# Patient Record
Sex: MALE | Race: WHITE | NOT HISPANIC OR LATINO | Employment: OTHER | URBAN - METROPOLITAN AREA
[De-identification: names, ages, dates, MRNs, and addresses within clinical notes are randomized per-mention and may not be internally consistent; named-entity substitution may affect disease eponyms.]

---

## 2018-05-24 ENCOUNTER — OFF PREMISE (OUTPATIENT)
Dept: OTHER | Age: 83
End: 2018-05-24

## 2018-11-05 ENCOUNTER — HOSPITAL ENCOUNTER (INPATIENT)
Facility: HOSPITAL | Age: 83
LOS: 2 days | Discharge: HOME | DRG: 392 | End: 2018-11-08
Attending: EMERGENCY MEDICINE | Admitting: HOSPITALIST
Payer: MEDICARE

## 2018-11-05 ENCOUNTER — APPOINTMENT (OUTPATIENT)
Dept: CT IMAGING | Facility: HOSPITAL | Age: 83
DRG: 392 | End: 2018-11-05
Attending: EMERGENCY MEDICINE
Payer: MEDICARE

## 2018-11-05 DIAGNOSIS — R10.9 ABDOMINAL PAIN, ACUTE: ICD-10-CM

## 2018-11-05 DIAGNOSIS — K44.9 HIATAL HERNIA: Primary | ICD-10-CM

## 2018-11-05 DIAGNOSIS — R11.11 VOMITING WITHOUT NAUSEA, INTRACTABILITY OF VOMITING NOT SPECIFIED, UNSPECIFIED VOMITING TYPE: ICD-10-CM

## 2018-11-05 PROCEDURE — 74177 CT ABD & PELVIS W/CONTRAST: CPT | Performed by: EMERGENCY MEDICINE

## 2018-11-05 RX ORDER — AMLODIPINE BESYLATE 10 MG/1
10 TABLET ORAL DAILY
COMMUNITY

## 2018-11-05 RX ORDER — MAGNESIUM HYDROXIDE/ALUMINUM HYDROXICE/SIMETHICONE 120; 1200; 1200 MG/30ML; MG/30ML; MG/30ML
30 SUSPENSION ORAL ONCE
Status: COMPLETED | OUTPATIENT
Start: 2018-11-05 | End: 2018-11-05

## 2018-11-05 RX ORDER — ALBUTEROL SULFATE 2.5 MG/3ML
SOLUTION RESPIRATORY (INHALATION) EVERY 6 HOURS PRN
COMMUNITY

## 2018-11-05 RX ORDER — KETOROLAC TROMETHAMINE 30 MG/ML
15 INJECTION, SOLUTION INTRAMUSCULAR; INTRAVENOUS ONCE
Status: COMPLETED | OUTPATIENT
Start: 2018-11-05 | End: 2018-11-05

## 2018-11-06 PROBLEM — R11.11 VOMITING WITHOUT NAUSEA, INTRACTABILITY OF VOMITING NOT SPECIFIED, UNSPECIFIED VOMITING TYPE: Status: ACTIVE | Noted: 2018-11-06

## 2018-11-06 PROBLEM — R10.9 ABDOMINAL PAIN, ACUTE: Status: ACTIVE | Noted: 2018-11-06

## 2018-11-06 PROBLEM — K44.9 HIATAL HERNIA: Status: ACTIVE | Noted: 2018-11-06

## 2018-11-06 PROCEDURE — 99223 1ST HOSP IP/OBS HIGH 75: CPT | Performed by: HOSPITALIST

## 2018-11-06 PROCEDURE — 99223 1ST HOSP IP/OBS HIGH 75: CPT | Performed by: SURGERY

## 2018-11-06 RX ORDER — POTASSIUM CHLORIDE 14.9 MG/ML
20 INJECTION INTRAVENOUS ONCE
Status: COMPLETED | OUTPATIENT
Start: 2018-11-06 | End: 2018-11-06

## 2018-11-06 RX ORDER — KETOROLAC TROMETHAMINE 30 MG/ML
15 INJECTION, SOLUTION INTRAMUSCULAR; INTRAVENOUS EVERY 6 HOURS PRN
Status: ACTIVE | OUTPATIENT
Start: 2018-11-06 | End: 2018-11-08

## 2018-11-06 RX ORDER — SODIUM CHLORIDE 9 MG/ML
INJECTION, SOLUTION INTRAVENOUS CONTINUOUS
Status: DISCONTINUED | OUTPATIENT
Start: 2018-11-06 | End: 2018-11-08

## 2018-11-06 RX ORDER — KETOROLAC TROMETHAMINE 30 MG/ML
30 INJECTION, SOLUTION INTRAMUSCULAR; INTRAVENOUS EVERY 6 HOURS PRN
Status: DISCONTINUED | OUTPATIENT
Start: 2018-11-06 | End: 2018-11-06

## 2018-11-06 RX ORDER — ALBUTEROL SULFATE 2.5 MG/3ML
2.5 SOLUTION RESPIRATORY (INHALATION) EVERY 6 HOURS PRN
Status: DISCONTINUED | OUTPATIENT
Start: 2018-11-06 | End: 2018-11-08

## 2018-11-06 NOTE — ED NOTES
Pt and son updated on inpt poc. Son states he will take pt's wallet home with him and will accompany pt to inpt room. Pt remains resting comfortably,breathing remains unlabored.

## 2018-11-06 NOTE — ED INITIAL ASSESSMENT (HPI)
Patient arrives after consuming chicken & beer around 6pm; had sudden epigastric pain that began 30min after eating. Patient has hx of hiatal hernia & esophageal stricture.

## 2018-11-06 NOTE — PROGRESS NOTES
IM addendum to Dr. Vivek Perry H&P:    Pt seen and examined. No acute events since admission, remains NPO, denies abdominal pain.     /78 (BP Location: Left arm)   Pulse 68   Temp 97.9 °F (36.6 °C) (Oral)   Resp 18   Ht 6' 2\" (1.88 m)   Wt 200 lb (90.7

## 2018-11-06 NOTE — CONSULTS
[facility]  Report of Consultation    Moshe Gaspar Patient Status:  Inpatient    1934 MRN [id number]   [facility] 3NE-A Attending Peg Alan MD   Hosp Day # 0 PCP No primary care provider on file.      Reason for Consultation:  Ab a an open left inguinal hernia with mesh as well as a right laparoscopic inguinal hernia with mesh.   The patient's son states that after his most recent procedure which was the laparoscopic inguinal herniorrhaphy he had significant hypertension which requi increased activity, polydipsia and polyphagia  ENMT:  Negative for ear drainage, hearing loss and nasal drainage  Eyes:  Negative for eye discharge and vision loss  Gastrointestinal: Positive for abdominal pain, nausea, vomiting  Genitourinary:  Negative f MCHC  34.4   RDW  14.4   NEPRELIM  10.12*   WBC  11.9   PLT  225.0       Recent Labs   Lab  11/05/18   2158   GLU  136*   BUN  11   CREATSERUM  0.99   GFRAA  81   GFRNAA  70   CA  9.5   ALB  4.0   NA  141   K  3.7   CL  106   CO2  29.0   ALKPHO  87   AST herniorrhaphy. PELVIC ORGANS:  Mild to moderate prostate enlargement  calcifications. Unremarkable urinary bladder. Pelvic phleboliths. LYMPH NODES:  No lymphadenopathy in the abdomen or pelvis. BONES:  Degenerative changes in the spine and hips.   Mil surgical intervention locally. He will discussed this with his son and this will also be dependent on his clinical course  4. Case discussed with Dr. Kezia Reynolds, he will follow  5.  GI and DVT prophylaxis    Time spent on counseling/coordination of care:  45

## 2018-11-06 NOTE — PROGRESS NOTES
Pharmacy Note: Renal dose adjustment Ketorolac (Toradol)  Nidia Mata has been prescribed Ketorolac (Toradol) 30 mg every 6 hours as needed for pain. Estimated Creatinine Clearance: 64.6 mL/min (based on SCr of 0.99 mg/dL).     His calculated creatinine c

## 2018-11-06 NOTE — ED PROVIDER NOTES
Patient Seen in: [facility] Emergency Department    History   Patient presents with:  Cough/URI  Abdomen/Flank Pain (GI/)    Stated Complaint: blockage in esophagus    HPI    Patient is an 29-year-old male comes in emergency room comes emergency r well appearing and non-toxic, Alert and oriented X 3   HEENT: Normocephalic, atraumatic. Moist mucous membranes. Pupils equal round reactive to light and accommodation, extraocular motion is intact, sclerae white, conjunctiva is pink.   Oropharynx is unre RAINBOW DRAW LIGHT GREEN   RAINBOW DRAW GOLD     EKG    Rate, intervals and axes as noted on EKG Report. Rate: 80  Rhythm: Sinus Rhythm  Reading: No acute changes. Incomplete right bundle branch block.               Ct Abdomen Pelvis Iv Contrast, No Essie Sayres phleboliths. LYMPH NODES:  No lymphadenopathy in the abdomen or pelvis. BONES:  Degenerative changes in the spine and hips. Mild age-indeterminate concave compression deformity of the superior endplate of K82. Clinical correlation recommended.  OTHER:  No Blayne Noriega [place] [id number] [phone]      As needed        Medications Prescribed:  Current Discharge Medication List        Present on Admission           ICD-10-CM Noted POA    * (Principal) Hiatal hernia K44.9 11/6/2018 Unknown    Abdominal pa

## 2018-11-06 NOTE — CONSULTS
.      Gastroenterology Initial Consultation    Alex Powell Patient Status:  Inpatient    1934 MRN [id number]   [facility] 3NE-A Attending Brandyn Chapin MD   Hosp Day # 0 PCP No primary care provider on file.        Reason for Consultati Intravenous, Q24H  •  ketorolac tromethamine (TORADOL) 30 MG/ML injection 15 mg, 15 mg, Intravenous, Q6H PRN  •  albuterol sulfate (VENTOLIN) (2.5 MG/3ML) 0.083% nebulizer solution 2.5 mg, 2.5 mg, Nebulization, Q6H PRN    Review of Systems:    Except for w nose and ears.  Normal appearance of lips, teeth, gums and oral mucosa  Neck: Normal neck inspection, normal thyroid palpation  Respiratory: Normal respiratory effort, normal breath sounds on bilateral auscultation  Cardiovascular: Normal carotid artery pul Asymptomatic at present. Son would like procedures/surgery to be performed at [facility] as he lives alone in [place].  -keep [address], D[address]    Gastroenterology and [address] [facility] Gastroenterology, [facility].

## 2018-11-06 NOTE — H&P
[facility]  History and Physical     Pilar Brighter Patient Status:  Emergency    1934 MRN [id number]   Location [address] [facility] Attending Bharath Larsen MD   Hosp Day # 0 PCP No primary care provider on file.      Chief hours as needed for Wheezing. Disp:  Rfl:    AmLODIPine Besylate 10 MG Oral Tab Take 10 mg by mouth daily. Disp:  Rfl:    RANITIDINE HCL OR Take by mouth.  Disp:  Rfl:      Physical Exam:    BP (!) 167/98   Pulse 84   Temp (!) 96.4 °F (35.8 °C) (Temporal) No  Plan of care discussed with Patient     Jessica Gonzales MD  11/6/2018

## 2018-11-06 NOTE — H&P (VIEW-ONLY)
.      Gastroenterology Initial Consultation    Agustin Storey Patient Status:  Inpatient    1934 MRN [id number]   [facility] 3NE-A Attending Stoney Perez MD   Hosp Day # 0 PCP No primary care provider on file.        Reason for Consultati Intravenous, Q24H  •  ketorolac tromethamine (TORADOL) 30 MG/ML injection 15 mg, 15 mg, Intravenous, Q6H PRN  •  albuterol sulfate (VENTOLIN) (2.5 MG/3ML) 0.083% nebulizer solution 2.5 mg, 2.5 mg, Nebulization, Q6H PRN    Review of Systems:    Except for w nose and ears.  Normal appearance of lips, teeth, gums and oral mucosa  Neck: Normal neck inspection, normal thyroid palpation  Respiratory: Normal respiratory effort, normal breath sounds on bilateral auscultation  Cardiovascular: Normal carotid artery pul Asymptomatic at present. Son would like procedures/surgery to be performed at [facility] as he lives alone in [place].  -keep [address], D.O.    Gastroenterology and [address] [facility] Gastroenterology, [facility].

## 2018-11-07 PROCEDURE — 0DJ08ZZ INSPECTION OF UPPER INTESTINAL TRACT, VIA NATURAL OR ARTIFICIAL OPENING ENDOSCOPIC: ICD-10-PCS | Performed by: INTERNAL MEDICINE

## 2018-11-07 PROCEDURE — 99232 SBSQ HOSP IP/OBS MODERATE 35: CPT | Performed by: HOSPITALIST

## 2018-11-07 PROCEDURE — 99232 SBSQ HOSP IP/OBS MODERATE 35: CPT | Performed by: SURGERY

## 2018-11-07 RX ORDER — NALOXONE HYDROCHLORIDE 0.4 MG/ML
80 INJECTION, SOLUTION INTRAMUSCULAR; INTRAVENOUS; SUBCUTANEOUS AS NEEDED
Status: DISCONTINUED | OUTPATIENT
Start: 2018-11-07 | End: 2018-11-07 | Stop reason: HOSPADM

## 2018-11-07 RX ORDER — HYDRALAZINE HYDROCHLORIDE 20 MG/ML
5 INJECTION INTRAMUSCULAR; INTRAVENOUS EVERY 4 HOURS PRN
Status: DISCONTINUED | OUTPATIENT
Start: 2018-11-07 | End: 2018-11-08

## 2018-11-07 RX ORDER — SODIUM CHLORIDE, SODIUM LACTATE, POTASSIUM CHLORIDE, CALCIUM CHLORIDE 600; 310; 30; 20 MG/100ML; MG/100ML; MG/100ML; MG/100ML
INJECTION, SOLUTION INTRAVENOUS CONTINUOUS
Status: DISCONTINUED | OUTPATIENT
Start: 2018-11-07 | End: 2018-11-08

## 2018-11-07 NOTE — PROGRESS NOTES
[facility]  Progress Note     Ne Tejada Patient Status:  Inpatient    1934 MRN [id number]   [facility] 3NE-A Attending Maryan Correa [address] Day # 1 PCP No primary care provider on file.      Chief Complaint: Ozzie Lópezting NPO  2. IVF  3. IV PPI  4. Gen Sx on consult. 2. H/o esophageal stricture  1. EGD with possible dilation today. 3. HTN- hold norvasc for now as NPO  4.  Asthma- inhalers PRN    Plan of care: Possible home today after EGD if able to tolerate a liquid diet

## 2018-11-07 NOTE — PROGRESS NOTES
[facility]  Progress Note    Alex Powell Patient Status:  Inpatient    1934 MRN [id number]   [facility] ENDOSCOPY Attending Don Hurd [facility] Day # 1 PCP No primary care provider on file.      Subjective:  Patient lyin have dietary changes that would need to be followed for at least 3-6 months after surgery. He would require hospitalization after surgery. Risks of the procedure include bleeding, blood clots, infection, and recurrence.   2. The risks of not performing perry accurate and complete.

## 2018-11-07 NOTE — PLAN OF CARE
Pt A&Ox3; [place]; very pleasant/chatty. Pt up with SBA- very steady. Strict NPO for EGD tomorrow. Consent signed and in chart. NS at 100 to right AC. No complaints of nausea/vomitting. Will continue to monitor.

## 2018-11-07 NOTE — OPERATIVE REPORT
Miguel Oliverman Patient Status:  Inpatient    1934 MRN [id number]   [facility] ENDOSCOPY Attending Holden [facility] Day # 1 PCP No primary care provider on file.        PREOPERATIVE DIAGNOSIS/INDICATION: History of e stricture. 2. 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 RECOMMENDATIONS:    1. Trial of clears. 2. Further management per general surgery. 3. Will sign off, please call with questions.      Peyton Alexander  Gastroenterology/Advanced E

## 2018-11-07 NOTE — INTERVAL H&P NOTE
Pre-op Diagnosis: * No pre-op diagnosis entered *    The above referenced H&P was reviewed by Mikaela Johnson DO on 11/7/2018, the patient was examined and no significant changes have occurred in the patient's condition since the H&P was performed.   I dis
no

## 2018-11-07 NOTE — PROGRESS NOTES
PT A/O, SON AT BEDSIDE, 94% ON RA, VOIDING IN BATHROOM, SCDS ON, UP WITH SB ASSIST, NPO, IVF INFUSING, DENIES NAUSEA OR PAIN.  EGD IN AM.

## 2018-11-08 VITALS
DIASTOLIC BLOOD PRESSURE: 80 MMHG | TEMPERATURE: 98 F | RESPIRATION RATE: 16 BRPM | BODY MASS INDEX: 25.67 KG/M2 | OXYGEN SATURATION: 94 % | HEART RATE: 76 BPM | WEIGHT: 200 LBS | HEIGHT: 74 IN | SYSTOLIC BLOOD PRESSURE: 142 MMHG

## 2018-11-08 PROCEDURE — 99232 SBSQ HOSP IP/OBS MODERATE 35: CPT | Performed by: SURGERY

## 2018-11-08 PROCEDURE — 99239 HOSP IP/OBS DSCHRG MGMT >30: CPT | Performed by: HOSPITALIST

## 2018-11-08 RX ORDER — AMLODIPINE BESYLATE 5 MG/1
10 TABLET ORAL DAILY
Status: DISCONTINUED | OUTPATIENT
Start: 2018-11-08 | End: 2018-11-08

## 2018-11-08 NOTE — PROGRESS NOTES
[facility]  Progress Note     [facility] Patient Status:  Inpatient    1934 MRN [id number]   [facility] 3NE-A [facility] No primary care provider on file.      Chief Complaint: Rodney Yeh 2/2 large hiatal hernia  1. Started on clears yesterday  2. Will D/C IVF  3. IV PPI  4. Gen Sx on consult and plans for surgery in 1 week. 2. H/o esophageal stricture  1. EGD yesterday showed no stricture and a normal mucosa.  Diet to be advvanced per GI

## 2018-11-08 NOTE — PROGRESS NOTES
[facility]  Progress Note    Khoa Katherines Patient Status:  Inpatient    1934 MRN [id number]   [facility] 3NE-A Attending James Khalil [facility] Day # 2 PCP No primary care provider on file.      Subjective:  Patient is Celeste Chimera hiatal hernia. 2. Advance diet as tolerated  3. We will continue PPI therapy at this time  4. Ambulate    My total face time with this patient was 26 minutes.   Greater than half of our visit was spent in counseling the patient on the above listed diagnose

## 2018-11-09 NOTE — PLAN OF CARE
Suzie Brighton  7/29/1934  Temp: 97.7 °F (36.5 °C)  Pulse: 76  Resp: 16  BP: 142/80    Po tolerated clear liquid, than full liquid diet than general.     /80 after taking normal amlodipine home dose. Ok for [employer] from all consults.  Dc instructions explain

## 2018-11-10 NOTE — DISCHARGE SUMMARY
[facility]  DISCHARGE SUMMARY     Bert Serrano Patient Status:  Inpatient    1934 MRN [id number]   [facility] 3NE-A Attending No att. providers found   Hosp Day # 2 PCP No primary care provider on file.      Date of Admission:  in 1-2 weeks.     Procedures during hospitalization:   • EGD    Incidental or significant findings and recommendations (brief descriptions):  • NOne    Lab/Test results pending at Discharge:   · None    Consultants:  • General surgery- Dr. Cooper White

## (undated) DEVICE — MEDI-VAC NON-CONDUCTIVE SUCTION TUBING: Brand: CARDINAL HEALTH

## (undated) DEVICE — Device: Brand: DEFENDO AIR/WATER/SUCTION AND BIOPSY VALVE

## (undated) DEVICE — MEDI-VAC SUCTION HANDLE REGULAR CAPACITY: Brand: CARDINAL HEALTH

## (undated) DEVICE — ENDOSCOPY PACK UPPER: Brand: MEDLINE INDUSTRIES, INC.

## (undated) DEVICE — 1200CC GUARDIAN II: Brand: GUARDIAN

## (undated) DEVICE — FILTERLINE NASAL ADULT O2/CO2

## (undated) DEVICE — 3M™ RED DOT™ MONITORING ELECTRODE WITH FOAM TAPE AND STICKY GEL, 50/BAG, 20/CASE, 72/PLT 2570: Brand: RED DOT™

## (undated) NOTE — ED AVS SNAPSHOT
Stacy Brown   MRN: [id number]    Department:  [facility] Emergency Department   Date of Visit:  11/5/2018           Disclosure     Insurance plans vary and the physician(s) referred by the ER may not be covered by your plan.  Please contact your insu tell this physician (or your personal doctor if your instructions are to return to your personal doctor) about any new or lasting problems. The primary care or specialist physician will see patients referred from the [facility] Emergency Department.  Inna Mckenzie

## (undated) NOTE — LETTER
Leila Larsen [address]  [place], [address] [facility]    Consent for Operation  Date: __________________                                Time: _______________    1.  I authorize the performance upon Bert Mouse the following operation:  Esophagogastrod videotape. The [facility] will not be responsible for storage or maintenance of this tape. 6. For the purpose of advancing medical education, I consent to the admittance of observers to the Operating Room.   7. I authorize the use of any specimen, organs, ti When the patient is a minor or mentally incompetent to give consent:  Signature of person authorized to consent for patient: ___________________________   Relationship to patient: ____________________________________________________    Signature of Witness these medicines may increase my risk of anesthetic complications. iv. If I am allergic to anything or have had a reaction to anesthesia before. 3. I understand how the anesthesia medicine will help me (benefits).   4. I understand that with any type of an patient’s representative) and answered their questions. The patient or their representative has agreed to have anesthesia services.     _____________________________________________________________________________  Witness        Date   Time  I have tunde

## (undated) NOTE — LETTER
Leila Larsen [address] [address]  [place], [address] [facility]    Consent for Operation  Date: __________________                                Time: _______________    1.  I authorize the performance upon Bert Mouse the following operation:  Procedure(s): procedure has been videotaped, the surgeon will obtain the original videotape. The hospital will not be responsible for storage or maintenance of this tape.   7. For the purpose of advancing medical education, I consent to the admittance of observers to the STATEMENTS REQUIRING INSERTION OR COMPLETION WERE FILLED IN.     Signature of Patient:   ___________________________    When the patient is a minor or mentally incompetent to give consent:  Signature of person authorized to consent for patient: ____________ drugs/illegal medications). Failure to inform my anesthesiologist about these medicines may increase my risk of anesthetic complications. iv. If I am allergic to anything or have had a reaction to anesthesia before.   3. I understand how the anesthesia med I have discussed the procedure and information above with the patient (or patient’s representative) and answered their questions. The patient or their representative has agreed to have anesthesia services.     _______________________________________________